# Patient Record
Sex: MALE | Race: WHITE
[De-identification: names, ages, dates, MRNs, and addresses within clinical notes are randomized per-mention and may not be internally consistent; named-entity substitution may affect disease eponyms.]

---

## 2018-11-08 ENCOUNTER — HOSPITAL ENCOUNTER (OUTPATIENT)
Dept: HOSPITAL 72 - PAN | Age: 58
Discharge: HOME | End: 2018-11-08
Payer: COMMERCIAL

## 2018-11-08 VITALS — SYSTOLIC BLOOD PRESSURE: 130 MMHG | DIASTOLIC BLOOD PRESSURE: 76 MMHG

## 2018-11-08 VITALS — WEIGHT: 174 LBS | BODY MASS INDEX: 23.57 KG/M2 | HEIGHT: 72 IN

## 2018-11-08 DIAGNOSIS — R10.84: Primary | ICD-10-CM

## 2018-11-08 DIAGNOSIS — R14.0: ICD-10-CM

## 2018-11-08 DIAGNOSIS — K21.9: ICD-10-CM

## 2018-11-08 PROCEDURE — 99202 OFFICE O/P NEW SF 15 MIN: CPT

## 2018-11-09 NOTE — GI INITIAL CONSULT NOTE
History of Present Illness


General


Date patient seen:  Nov 8, 2018


Time patient seen:  15:44


Referring physician:  HMO


Reason for Consultation:  ABDOMINAL PAIN





Present Illness


HPI


58 year old male patient presents today with c/ of generalized abdominal pain, 

GERD, and abdominal bloating.  States his last EGD/colonoscopy was 

approximately 5- 6 years ago in which the findings were unremarkable.  Denies 

any unintentional weight loss or changes in dietary habits.  No signs of abuse 

or neglect.  Patient is not fall risk.


Home Meds


Reported Medications


Omeprazole Magnesium (PRILOSEC OTC) 20 Mg Tablet., 20 MG ORAL DAILY, TAB


   11/8/18


Med list reviewed/reconciled:  Yes


Allergies:  


Coded Allergies:  


     No Known Allergies (Unverified , 11/8/18)





Patient History


History Provided By:  Patient, Medical Record


[facility] Narrative


GERD





Past Surgical History:


Hemorrhoidal


Family History Narrative


Father with stomach CA


Mother with colon CA


Social History:  Denies: smoking, alcohol use, drug use, other





Review of Systems


All Other Systems:  negative except mentioned in HPI





Physical Exam





Vital Signs








  Date Time  Temp Pulse Resp B/P (MAP) Pulse Ox O2 Delivery O2 Flow Rate FiO2


 


11/8/18 15:35 98.3 55  130/76 96   








Sp02 EP Interpretation:  reviewed, normal


General Appearance:  well appearing, no apparent distress, alert


Head:  normocephalic


EENT:  PERRL/EOMI, normal ENT inspection


Neck:  supple


Respiratory:  normal breath sounds, no respiratory distress


Cardiovascular:  normal rate


Gastrointestinal:  normal inspection, non tender, soft, normal bowel sounds, non

-distended


Rectal:  deferred


Genitourinary:  deferred


Musculoskeletal:  normal inspection, back normal


Neurologic:  normal inspection, alert, oriented x3, responsive


Psychiatric:  normal inspection, judgement/insight normal, memory normal


Skin:  normal inspection, normal color, no rash, warm/dry, palpation normal, 

well hydrated


Lymphatic:  normal inspection, no adenopathy





GI: Plan


Problems:  


(1) Family history of gastric cancer


(2) Colonoscopy planned


(3) GERD (gastroesophageal reflux disease)


(4) Abdominal pain


Plan


Patient to be scheduled for EGD/colonoscopy pending PA, will contact patient.


- CLD & (Nulytely/Suprep/Movi-Prep) prep instructions given and acknowledged by 

patient.


- NPO @ MN day prior procedure explained.


- screening labs reviewed with patient.


will follow with additional recs post procedure.





Seen with Dr. Sotomayor.


Thank you for this patient referral.





The patient was seen and examined at bedside and all new and available data was 

reviewed in the patients chart. I agree with the above findings, impression 

and plan.  (Patient seen earlier today. Signature stamp does not reflect 

patient encounter time.). - [facility] [place] Nov 9, 2018 15:48